# Patient Record
Sex: FEMALE | Race: BLACK OR AFRICAN AMERICAN | NOT HISPANIC OR LATINO | URBAN - METROPOLITAN AREA
[De-identification: names, ages, dates, MRNs, and addresses within clinical notes are randomized per-mention and may not be internally consistent; named-entity substitution may affect disease eponyms.]

---

## 2017-05-15 ENCOUNTER — EMERGENCY (EMERGENCY)
Facility: HOSPITAL | Age: 52
LOS: 1 days | Discharge: HOME | End: 2017-05-15
Attending: EMERGENCY MEDICINE | Admitting: EMERGENCY MEDICINE
Payer: COMMERCIAL

## 2017-05-15 VITALS
OXYGEN SATURATION: 99 % | RESPIRATION RATE: 18 BRPM | TEMPERATURE: 98 F | DIASTOLIC BLOOD PRESSURE: 92 MMHG | HEART RATE: 73 BPM | SYSTOLIC BLOOD PRESSURE: 157 MMHG

## 2017-05-15 PROCEDURE — 26750 TREAT FINGER FRACTURE EACH: CPT | Mod: FA

## 2017-05-15 PROCEDURE — 90715 TDAP VACCINE 7 YRS/> IM: CPT

## 2017-05-15 PROCEDURE — 90471 IMMUNIZATION ADMIN: CPT

## 2017-05-15 PROCEDURE — 26750 TREAT FINGER FRACTURE EACH: CPT | Mod: 54

## 2017-05-15 PROCEDURE — 99283 EMERGENCY DEPT VISIT LOW MDM: CPT | Mod: 25

## 2017-05-15 PROCEDURE — 73140 X-RAY EXAM OF FINGER(S): CPT | Mod: 26,LT

## 2017-05-15 PROCEDURE — 73140 X-RAY EXAM OF FINGER(S): CPT

## 2017-05-15 RX ORDER — ACETAMINOPHEN 500 MG
650 TABLET ORAL ONCE
Qty: 0 | Refills: 0 | Status: COMPLETED | OUTPATIENT
Start: 2017-05-15 | End: 2017-05-15

## 2017-05-15 RX ORDER — TETANUS TOXOID, REDUCED DIPHTHERIA TOXOID AND ACELLULAR PERTUSSIS VACCINE, ADSORBED 5; 2.5; 8; 8; 2.5 [IU]/.5ML; [IU]/.5ML; UG/.5ML; UG/.5ML; UG/.5ML
0.5 SUSPENSION INTRAMUSCULAR ONCE
Qty: 0 | Refills: 0 | Status: COMPLETED | OUTPATIENT
Start: 2017-05-15 | End: 2017-05-15

## 2017-05-15 RX ADMIN — TETANUS TOXOID, REDUCED DIPHTHERIA TOXOID AND ACELLULAR PERTUSSIS VACCINE, ADSORBED 0.5 MILLILITER(S): 5; 2.5; 8; 8; 2.5 SUSPENSION INTRAMUSCULAR at 02:54

## 2017-05-15 RX ADMIN — Medication 650 MILLIGRAM(S): at 02:54

## 2017-05-15 NOTE — ED PROVIDER NOTE - ATTENDING CONTRIBUTION TO CARE
------------ATTENDING NOTE------------   51 yo RHD female w/  c/o accidentally hitting L thumb w/ hammer 24 hrs ago, c/o slight swelling, moderate dull ache, ------------ATTENDING NOTE------------   51 yo RHD female w/  c/o accidentally hitting L thumb w/ hammer 24 hrs ago, c/o slight swelling, moderate dull ache, worse w/ bending, no numbness/weakness, unknown last tetanus update, awaiting radiographs -->  - Tai Smith MD   ---------------------------------------------------------------------- ------------ATTENDING NOTE------------   53 yo RHD female w/  c/o accidentally hitting L thumb w/ hammer 24 hrs ago, c/o slight swelling, moderate dull ache, worse w/ bending, no numbness/weakness, unknown last tetanus update, awaiting radiographs --> distal tuft fx, splint, in depth d/w all about ddx, tx, janet, sal.  - Tai Smith MD   ----------------------------------------------------------------------

## 2017-05-15 NOTE — ED PROVIDER NOTE - CARE PLAN
Principal Discharge DX:	Contusion of left thumb without damage to nail, initial encounter Principal Discharge DX:	Contusion of left thumb without damage to nail, initial encounter  Goal:	L 5th Distal Tuft Fx

## 2017-05-15 NOTE — ED PROVIDER NOTE - PHYSICAL EXAMINATION
Well Appearing, Nontoxic, NAD;  Symm Facies, PERRL 3mm, (-)Pallor, MMM;  AOX3, Normal speech, normal strength/sensation/gait;  L thumb w/ swelling distal to MCP, decreased ROM IP/MCP 2nd pain, nvi w/ bcr distally, trace subungual hematoma, nail intact

## 2017-05-15 NOTE — ED PROCEDURE NOTE - PROCEDURE ADDITIONAL DETAILS
L Thumb Distal Tuft Fx, nail intact, nvi w/ bcr distally    - aluminum/foam prefab splint    - normal alignment    Tai Smith MD

## 2017-05-15 NOTE — ED ADULT NURSE NOTE - OBJECTIVE STATEMENT
52 y.o. female presents ambulatory to ED c/o thumb injury. 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 Took advil and aleve prior to arrival. bleeding at nailbed is controlled. bruising and swelling noted to thumb. Unknown tetanus history. no other obvious injuries. Pulses and sensory in tact.
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
## 2017-05-19 DIAGNOSIS — S69.92XA UNSPECIFIED INJURY OF LEFT WRIST, HAND AND FINGER(S), INITIAL ENCOUNTER: ICD-10-CM
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
## 2017-05-19 DIAGNOSIS — Z23 ENCOUNTER FOR IMMUNIZATION: ICD-10-CM

## 2017-05-19 DIAGNOSIS — Z88.2 ALLERGY STATUS TO SULFONAMIDES: ICD-10-CM
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
## 2017-05-19 DIAGNOSIS — Z98.890 OTHER SPECIFIED POSTPROCEDURAL STATES: ICD-10-CM
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
## 2017-05-19 DIAGNOSIS — S62.522A DISPLACED FRACTURE OF DISTAL PHALANX OF LEFT THUMB, INITIAL ENCOUNTER FOR CLOSED FRACTURE: ICD-10-CM

## 2019-06-12 ENCOUNTER — APPOINTMENT (OUTPATIENT)
Dept: ORTHOPEDIC SURGERY | Facility: CLINIC | Age: 54
End: 2019-06-12
Payer: COMMERCIAL

## 2019-06-12 VITALS
HEIGHT: 64 IN | HEART RATE: 65 BPM | SYSTOLIC BLOOD PRESSURE: 146 MMHG | BODY MASS INDEX: 38.58 KG/M2 | DIASTOLIC BLOOD PRESSURE: 77 MMHG | WEIGHT: 226 LBS

## 2019-06-12 DIAGNOSIS — M17.12 UNILATERAL PRIMARY OSTEOARTHRITIS, LEFT KNEE: ICD-10-CM

## 2019-06-12 PROCEDURE — 99204 OFFICE O/P NEW MOD 45 MIN: CPT

## 2019-06-12 NOTE — PHYSICAL EXAM
[de-identified] : AP, notch standing, lateral and sunrise Xrays of the left knee:\par \par \par outside xrays 5/24/2019- medial and patellofemoral compartment degenerative narrowing, marginal osteophytes\par  [de-identified] : Well-nourished, in no acute distress\par Alert and oriented to time, place and person\par Skin: no lesions discoloration\par Respirations: unlabored\par Cardiac: no leg swelling\par Lymphatic: no groin adenopathy\par left knee: neutral alignment,  dorsal pedal pulse 1+, effusion 1+, flexion 0-110, crepitus, ligaments intact, tender lateral joint line, tender lateral facet patella

## 2019-06-12 NOTE — DISCUSSION/SUMMARY
[de-identified] : The patient presents with left knee pain and OA. We discussed the nature of the condition and treatment options. I am prescribing physical therapy. I am also referring the patient for an injection into the left knee. \par

## 2019-06-12 NOTE — ADDENDUM
[FreeTextEntry1] : I, Tai Blandon, acted solely as a scribe for Dr. George Song on 06/12/2019  .\par  \par All medical record entries made by the scribe were at my, Dr. George Song, direction and personally dictated by me on 06/12/2019. I have reviewed the chart and agree that the record accurately reflects my personal performance of the history, physical exam, assessment and plan. I have also personally directed, reviewed, and agreed with the chart.\par

## 2019-06-12 NOTE — HISTORY OF PRESENT ILLNESS
[de-identified] : 55 y/o spontaneous pain onset one year ago. symptom severity increased past few months. The pain is  anterior aspect. No radiation, but in bed reports additional left leg pain. The pain is aggravated by going up stairs. She reports relief of pain with anti inflammatories  but has limited intake because of concern of GI side effects. \par

## 2019-07-19 ENCOUNTER — APPOINTMENT (OUTPATIENT)
Dept: ORTHOPEDIC SURGERY | Facility: CLINIC | Age: 54
End: 2019-07-19
Payer: COMMERCIAL

## 2019-07-19 VITALS — BODY MASS INDEX: 38.58 KG/M2 | HEIGHT: 64 IN | WEIGHT: 226 LBS

## 2019-07-19 DIAGNOSIS — M17.12 UNILATERAL PRIMARY OSTEOARTHRITIS, LEFT KNEE: ICD-10-CM

## 2019-07-19 PROCEDURE — 99214 OFFICE O/P EST MOD 30 MIN: CPT | Mod: 25

## 2019-07-19 PROCEDURE — 20610 DRAIN/INJ JOINT/BURSA W/O US: CPT | Mod: LT

## 2019-07-19 NOTE — HISTORY OF PRESENT ILLNESS
[de-identified] : Patient is here for knee pain that began about 6 months ago without inciting event. She has been taking NSAIDs for pain but has done nothing else to treat it.  Patient was evaluated by Dr. Song and referred for cortisone injection. Patient would like to proceed with the injection at this time. There has been no significant change in their condition since last evaluation.

## 2019-07-19 NOTE — PROCEDURE
[de-identified] : Injection: Left knee joint.\par Indication: Osteoarthritis.\par \par A discussion was had with the patient regarding this procedure and all questions were answered. All risks, benefits and alternatives were discussed. These included but were not limited to bleeding, infection, allergic reaction and reaccumulation of fluid. A timeout was done to ensure correct side and pt agreed to the procedure.  Alcohol was used to clean the skin, and betadine was used to sterilize and prep the area in the lateral joint line aspect of the knee. Ethyl chloride spray was then used as a topical anesthetic. A 22-gauge needle was used to inject 2cc of 0.25% bupivacaine and 1cc of 40mg/ml methylprednisolone into the knee. A sterile bandage was then applied. The patient tolerated the procedure well.

## 2019-07-19 NOTE — DISCUSSION/SUMMARY
[de-identified] : Discussed findings of today's exam and possible causes of patient's pain. Educated patient on their most probable diagnosis of left knee osteoarthritis. The patient does have localized medial joint line tenderness, but she also has significant medial joint line narrowing, if any meniscus tears present is likely degenerative in nature and would not need surgical intervention as a primary treatment choice, as such patient was advised that we should defer MRI at this time for other conservative management.  Reviewed possible courses of treatment, and we collaboratively decided best course of treatment at this time will include cortisone injection today (see procedure note). Informed the patient that the numbing medicine in today's injection will last for about 4-6 hours. The steroid that was injected will start to work in 1 to 2 days, peak at 1-2 weeks, and may last up to 1-2 months. Patient may take oral NSAIDs as needed. Follow up as needed. Patient appreciates and agrees with current plan.\par \par This note was generated using dragon medical dictation software. A reasonable effort has been made for proofreading its contents, but typos may still remain. If there are any questions or points of clarification needed please notify my office. \par

## 2021-04-30 ENCOUNTER — APPOINTMENT (OUTPATIENT)
Dept: ORTHOPEDIC SURGERY | Facility: CLINIC | Age: 56
End: 2021-04-30
Payer: COMMERCIAL

## 2021-04-30 VITALS
HEART RATE: 90 BPM | SYSTOLIC BLOOD PRESSURE: 177 MMHG | WEIGHT: 259 LBS | HEIGHT: 64 IN | OXYGEN SATURATION: 96 % | BODY MASS INDEX: 44.22 KG/M2 | DIASTOLIC BLOOD PRESSURE: 106 MMHG

## 2021-04-30 DIAGNOSIS — M43.16 SPONDYLOLISTHESIS, LUMBAR REGION: ICD-10-CM

## 2021-04-30 PROCEDURE — 99072 ADDL SUPL MATRL&STAF TM PHE: CPT

## 2021-04-30 PROCEDURE — 99214 OFFICE O/P EST MOD 30 MIN: CPT

## 2021-04-30 NOTE — PHYSICAL EXAM
[de-identified] : Examination of the lumbar spine reveals no midline tenderness palpation, step-offs, or skin lesions. Decreased range of motion with respect to flexion, extension, lateral bending, and rotation. No tenderness to palpation of the sciatic notch. No tenderness palpation of the bilateral greater trochanters. No pain with passive internal/external rotation of the hips. No instability of bilateral lower extremities.  Negative MARLYS. Negative straight leg raise bilaterally. No bowstring. Negative femoral stretch. 5 out of 5 iliopsoas, hip abductors, hips adductors, quadriceps, hamstrings, gastrocsoleus, tibialis anterior, extensor hallucis longus, peroneals. Grossly intact sensation to light touch bilateral lower extremities. 1+ patellar and Achilles reflexes. Downgoing Babinski. No clonus. Intact proprioception. Palpable pulses. No skin lesion and no edema on the right and left lower extremities. [de-identified] : Reviewed xray report from [facility] reveals L4-5 spondylolisthesis

## 2021-04-30 NOTE — DISCUSSION/SUMMARY
[de-identified] : She will try some physical therapy as well as Voltaren.  MRI if not improved or worsened.

## 2021-04-30 NOTE — HISTORY OF PRESENT ILLNESS
[de-identified] : Ms. FRAN SHEA  is a 55 year old female who presents with one year of low back pain and proximal thigh pain.  She works in housekeeping and bakes a great deal.  Denies any LE radicular symptoms.  Normal bowel and bladder control.   Denies any recent fevers, chills, sweats, weight loss, or infection.  She went to [facility] and was given an anti-inflammatory which has helped her. \par \par The patients past medical history, past surgical history, medications, allergies, and social history were reviewed by me today with the patient and documented accordingly.  In addition, the patient's family history, which is noncontributory to their visit, was also reviewed.\par

## 2021-05-19 ENCOUNTER — RX RENEWAL (OUTPATIENT)
Age: 56
End: 2021-05-19

## 2021-06-03 ENCOUNTER — APPOINTMENT (OUTPATIENT)
Dept: GASTROENTEROLOGY | Facility: CLINIC | Age: 56
End: 2021-06-03
Payer: COMMERCIAL

## 2021-06-03 ENCOUNTER — NON-APPOINTMENT (OUTPATIENT)
Age: 56
End: 2021-06-03

## 2021-06-03 VITALS
TEMPERATURE: 96.9 F | OXYGEN SATURATION: 98 % | SYSTOLIC BLOOD PRESSURE: 125 MMHG | WEIGHT: 248.38 LBS | BODY MASS INDEX: 42.4 KG/M2 | HEIGHT: 64 IN | DIASTOLIC BLOOD PRESSURE: 90 MMHG | HEART RATE: 80 BPM

## 2021-06-03 DIAGNOSIS — Z12.11 ENCOUNTER FOR SCREENING FOR MALIGNANT NEOPLASM OF COLON: ICD-10-CM

## 2021-06-03 DIAGNOSIS — Z86.010 ENCOUNTER FOR SCREENING FOR MALIGNANT NEOPLASM OF COLON: ICD-10-CM

## 2021-06-03 PROCEDURE — 99203 OFFICE O/P NEW LOW 30 MIN: CPT

## 2021-06-03 PROCEDURE — 99072 ADDL SUPL MATRL&STAF TM PHE: CPT

## 2021-06-25 PROBLEM — Z12.11 ENCOUNTER FOR COLONOSCOPY DUE TO HISTORY OF ADENOMATOUS COLONIC POLYPS: Status: RESOLVED | Noted: 2021-06-25 | Resolved: 2021-07-09

## 2021-06-25 NOTE — HISTORY OF PRESENT ILLNESS
[FreeTextEntry1] : 56 here to discuss screening colonoscopy\par No symptoms\par No melena or hematochezia\par No cramping or bloating or tenesmus\par No weight loss or change in appetite\par No upper GI symptoms

## 2021-06-25 NOTE — PHYSICAL EXAM
[General Appearance - Alert] : alert [General Appearance - In No Acute Distress] : in no acute distress [General Appearance - Well Nourished] : well nourished [General Appearance - Well Developed] : well developed [Sclera] : the sclera and conjunctiva were normal [Neck Appearance] : the appearance of the neck was normal [Edema] : there was no peripheral edema [Abdomen Soft] : soft [Bowel Sounds] : normal bowel sounds [Abdomen Tenderness] : non-tender [Cervical Lymph Nodes Enlarged Posterior Bilaterally] : posterior cervical [Cervical Lymph Nodes Enlarged Anterior Bilaterally] : anterior cervical [No CVA Tenderness] : no ~M costovertebral angle tenderness [No Spinal Tenderness] : no spinal tenderness [Nail Clubbing] : no clubbing  or cyanosis of the fingernails [] : no rash [No Focal Deficits] : no focal deficits [Oriented To Time, Place, And Person] : oriented to person, place, and time [Impaired Insight] : insight and judgment were intact [Affect] : the affect was normal

## 2023-02-10 ENCOUNTER — APPOINTMENT (OUTPATIENT)
Dept: PAIN MANAGEMENT | Facility: CLINIC | Age: 58
End: 2023-02-10
Payer: COMMERCIAL

## 2023-02-10 VITALS — WEIGHT: 248 LBS | HEIGHT: 64 IN | BODY MASS INDEX: 42.34 KG/M2

## 2023-02-10 DIAGNOSIS — Z86.79 PERSONAL HISTORY OF OTHER DISEASES OF THE CIRCULATORY SYSTEM: ICD-10-CM

## 2023-02-10 DIAGNOSIS — Z78.9 OTHER SPECIFIED HEALTH STATUS: ICD-10-CM

## 2023-02-10 DIAGNOSIS — M54.16 RADICULOPATHY, LUMBAR REGION: ICD-10-CM

## 2023-02-10 PROCEDURE — 72100 X-RAY EXAM L-S SPINE 2/3 VWS: CPT

## 2023-02-10 PROCEDURE — 99204 OFFICE O/P NEW MOD 45 MIN: CPT

## 2023-02-10 RX ORDER — DICLOFENAC SODIUM 75 MG/1
75 TABLET, DELAYED RELEASE ORAL TWICE DAILY
Qty: 60 | Refills: 0 | Status: ACTIVE | COMMUNITY
Start: 2023-02-10 | End: 1900-01-01

## 2023-02-10 NOTE — HISTORY OF PRESENT ILLNESS
[Lower back] : lower back [5] : 5 [3] : 3 [Radiating] : radiating [Throbbing] : throbbing [Constant] : constant [Household chores] : household chores [Leisure] : leisure [Sleep] : sleep [Rest] : rest [Meds] : meds [Tingling] : tingling [FreeTextEntry1] : 02/10/2023 : Patient presents for initial evaluation. She reports lower back pain that started last year when she returned back to work as a . Pain is located in the left lower back and right buttock. +n/t in the feet. mild weakness.  Similar pain in 2021 which was resolved with NSAIDS\par \par Subjective Weakness: Yes\par Numbness/Tingling: Yes\par Bladder/Bowel dysfunction: No\par Physical Therapy:No\par \par XR: mild lithesis at L4/5 with mild DDD, on AP has FA at L4/5 L5/S1\par \par \par Attempted modalities for current pain complaint:\par See above:\par Medications: No- Aleve Arthritis PRN\par \par Injections: No \par \par Previous Spine Surgery: N/A\par \par Imaging:\par MRI Lumbar Spine (date): NTD \par   [] : no [FreeTextEntry6] : numbness on the toes and fingers  [FreeTextEntry7] : right buttock.  [FreeTextEntry9] : ointment  [de-identified] : house work

## 2023-02-10 NOTE — PHYSICAL EXAM
[NL (90)] : forward flexion 90 degrees [Extension] : extension [4___] : right hip flexion 4[unfilled]/5 [] : no lumbar paraspinal spasm [de-identified] : extension 20 degrees

## 2023-02-10 NOTE — ASSESSMENT
[FreeTextEntry1] : After discussing various treatment options with the patient including but not limited to oral medications, physical therapy, exercise, modalities as well as interventional spinal injections, we have decided with the following plan:\par \par 1) A MRI is indicated as there has been failure of numerous conservative therapies over the last 6-8 weeks. these include but are not limited to medication therapy and physical therapy. It is recognized that repeat imaging studies and other tests may be warranted by the clinical course and/or to follow the progress of treatment in some cases. It may be of value to repeat diagnostic procedures (ie imaging studies) during the course of care to reassess or stage the pathology when there is progression of symptoms or findings, prior to surgical interventions and/or therapeutic injections when clinically indicated. \par \par 2) The patient would benefit from continuation of physical therapy. Short and Long Term goals would be improvement of pain level, improvement of range of motion, improvement of strength and overall improvement of quality of life.\par \par Patient instructed to continue both active and passive therapy, at home as an extension of the treatment process in order to maintain improvement. \par \par Goals: improve cardiovascular fitness, reduce edema, improve muscle strength, improve connective tissue strength and integrity, increase bone density, promote circulation to enhance soft tissue healing, improvement of muscle recruitment, increased ROM and promotion of normal movement. \par \par 3) There is a moderate risk of morbidity with further treatment, especially from use of prescription strength medications and possible side effects of these medications which include upset stomach with oral medications, skin reactions to topical medications and cardiac/renal issues with long term use.\par \par I recommended that the patient follow-up with their medical physician to discuss any significant specific potential issues with long term medication use such as interactions with current medications or with exacerbation of underlying medical comorbidities.\par \par The benefits and risks associated with use of injectable, oral or topical, prescription and over the counter anti-inflammatory medications were discussed with the patient. The patient voiced understanding of the risks including but not limited to bleeding, stroke, kidney dysfunction, heart disease, and were referred to the black box warning label for further information.

## 2023-02-10 NOTE — CONSULT LETTER
[Dear  ___] : Dear  [unfilled], [Consult Letter:] : I had the pleasure of evaluating your patient, [unfilled]. [( Thank you for referring [unfilled] for consultation for _____ )] : Thank you for referring [unfilled] for consultation for [unfilled] [Please see my note below.] : Please see my note below. [Consult Closing:] : Thank you very much for allowing me to participate in the care of this patient.  If you have any questions, please do not hesitate to contact me. [Sincerely,] : Sincerely, [FreeTextEntry3] : Karlie Marshall MD

## 2023-02-17 ENCOUNTER — FORM ENCOUNTER (OUTPATIENT)
Age: 58
End: 2023-02-17

## 2023-02-18 ENCOUNTER — APPOINTMENT (OUTPATIENT)
Dept: MRI IMAGING | Facility: CLINIC | Age: 58
End: 2023-02-18
Payer: COMMERCIAL

## 2023-02-18 PROCEDURE — 72148 MRI LUMBAR SPINE W/O DYE: CPT

## 2023-03-17 ENCOUNTER — APPOINTMENT (OUTPATIENT)
Dept: PAIN MANAGEMENT | Facility: CLINIC | Age: 58
End: 2023-03-17

## 2023-03-28 RX ORDER — DICLOFENAC SODIUM 75 MG/1
75 TABLET, DELAYED RELEASE ORAL
Qty: 30 | Refills: 0 | Status: ACTIVE | COMMUNITY
Start: 2021-04-30

## 2023-04-28 ENCOUNTER — APPOINTMENT (OUTPATIENT)
Dept: PAIN MANAGEMENT | Facility: CLINIC | Age: 58
End: 2023-04-28
Payer: COMMERCIAL

## 2023-04-28 VITALS — BODY MASS INDEX: 42.34 KG/M2 | WEIGHT: 248 LBS | HEIGHT: 64 IN

## 2023-04-28 DIAGNOSIS — M47.816 SPONDYLOSIS W/OUT MYELOPATHY OR RADICULOPATHY, LUMBAR REGION: ICD-10-CM

## 2023-04-28 DIAGNOSIS — M17.11 UNILATERAL PRIMARY OSTEOARTHRITIS, RIGHT KNEE: ICD-10-CM

## 2023-04-28 PROCEDURE — 73560 X-RAY EXAM OF KNEE 1 OR 2: CPT | Mod: LT

## 2023-04-28 PROCEDURE — 99214 OFFICE O/P EST MOD 30 MIN: CPT

## 2023-04-28 NOTE — HISTORY OF PRESENT ILLNESS
[Lower back] : lower back [5] : 5 [3] : 3 [Radiating] : radiating [Throbbing] : throbbing [Tingling] : tingling [Constant] : constant [Household chores] : household chores [Leisure] : leisure [Sleep] : sleep [Rest] : rest [Meds] : meds [FreeTextEntry1] : 4/28/23: pt is following up for lower back pain MRI. 2/18/23: Impression: \par 1. T11-T12: Broad bulge.\par 2. L1-L2: Facet hypertrophy and ligamentum flavum hypertrophy.\par 3. L2-L3: Facet hypertrophy and ligamentum flavum hypertrophy.\par 4. L3-L4: Facet hypertrophy and ligamentum flavum hypertrophy.\par 5. L4-L5: Grade 1 anterior spondylolisthesis. Broad bulge, facet hypertrophy, and ligamentum flavum \par hypertrophy with inferior foraminal stenosis.\par 6. L5-S1: Broad bulge, facet hypertrophy left greater than right with inferior foraminal stenosis left greater than \par right.\par 7. Enlarged uterus incompletely evaluated uterine leiomyoma. Ultrasound suggested for further evaluation.\par \par Pain correlates with her MRI with facet arthrosis throughout.  Pain in the b/l knees. had visco in the left knee last year with some relief.  had CSI in the left knee in the past that was ineffective. takes NSAIDS \par \par 02/10/2023 : Patient presents for initial evaluation. She reports lower back pain that started last year when she returned back to work as a . Pain is located in the left lower back and right buttock. +n/t in the feet. mild weakness.  Similar pain in 2021 which was resolved with NSAIDS\par \par Subjective Weakness: Yes\par Numbness/Tingling: Yes\par Bladder/Bowel dysfunction: No\par Physical Therapy:No\par \par XR: mild lithesis at L4/5 with mild DDD, on AP has FA at L4/5 L5/S1\par \par \par Attempted modalities for current pain complaint:\par See above:\par Medications: No- Aleve Arthritis PRN\par \par Injections: No \par \par Previous Spine Surgery: N/A\par \par Imaging:\par MRI Lumbar Spine (2/18/23)\par   [] : no [FreeTextEntry6] : numbness on the toes and fingers  [FreeTextEntry7] : right buttock.  [FreeTextEntry9] : ointment  [de-identified] : house work

## 2023-04-28 NOTE — ASSESSMENT
[FreeTextEntry1] : After discussing various treatment options with the patient including but not limited to oral medications, physical therapy, exercise, modalities as well as interventional spinal injections, we have decided with the following plan:\par \par 1) Intervention Injection Therapy:\par I personally reviewed the MRI/CT scan images and agree with the radiologist's report. The radiological findings were discussed with the patient.\par The risks, benefits, contents and alternatives to injection were explained in full to the patient. Risks outlined include but are not limited to infection,sepsis, bleeding, post-dural puncture headache, nerve damage, temporary increase in pain, syncopal episode, failure to resolve symptoms, allergic reaction, symptom recurrence, and elevation of blood sugar in diabetics. Cortisone may cause immunosuppression. Patient understands the risks. All questions were answered. After discussion of options, patient requested an injection. Information regarding the injection was given to the patient. Which medications to stop prior to the injection was explained to the patient as well.\par \par Follow up in 1-2 weeks post injection for re-evaluation. \par Continue Home exercises, stretching, activity modification, physical therapy, and conservative care.\par \par Patient presents with axial lumbar pain that has not responded to  3 months of conservative therapy including physical therapy or NSAID therapy.  The pain is interfering with activities of daily living and functionality.   There is no radicular pain.   The pain is exacerbated by facet loading.  Positive Kemps maneuver which is defined by pain reproduction with extension and rotation of the lumbar spine to the affected side.  The patient has not had a vertebral fusion at the levels of the proposed treatment.  There is no unexplained neurologic deficit.  There is no history of systemic infection, unstable medical condition, bleeding tendency, or local infection.  The injection is being performed to diagnose the facet joint as the source of the individual's pain. \par \par 2) The patient would benefit from continuation of physical therapy. Short and Long Term goals would be improvement of pain level, improvement of range of motion, improvement of strength and overall improvement of quality of life.\par \par Patient instructed to continue both active and passive therapy, at home as an extension of the treatment process in order to maintain improvement. \par \par Goals: improve cardiovascular fitness, reduce edema, improve muscle strength, improve connective tissue strength and integrity, increase bone density, promote circulation to enhance soft tissue healing, improvement of muscle recruitment, increased ROM and promotion of normal movement. \par \par 3) auth for visco b/l knees. xrays reveal b/l medial compartment arthrosis and patellofemoral arthrosis.

## 2023-04-28 NOTE — PHYSICAL EXAM
[NL (90)] : forward flexion 90 degrees [Extension] : extension [4___] : right hip flexion 4[unfilled]/5 [Bilateral] : knee bilaterally [] : no erythema [TWNoteComboBox7] : flexion 95 degrees [de-identified] : extension -5 degrees

## 2023-05-15 RX ORDER — HYALURONATE SODIUM 30 MG/2 ML
30 SYRINGE (ML) INTRAARTICULAR
Qty: 8 | Refills: 0 | Status: ACTIVE | COMMUNITY
Start: 2023-05-15 | End: 1900-01-01

## 2023-08-31 ENCOUNTER — APPOINTMENT (OUTPATIENT)
Dept: PAIN MANAGEMENT | Facility: CLINIC | Age: 58
End: 2023-08-31

## 2023-08-31 NOTE — HISTORY OF PRESENT ILLNESS
[Lower back] : lower back [5] : 5 [3] : 3 [Radiating] : radiating [Throbbing] : throbbing [Tingling] : tingling [Constant] : constant [Household chores] : household chores [Leisure] : leisure [Sleep] : sleep [Rest] : rest [Meds] : meds [FreeTextEntry1] : 8/31/23 4/28/23: pt is following up for lower back pain MRI. 2/18/23: Impression:  1. T11-T12: Broad bulge. 2. L1-L2: Facet hypertrophy and ligamentum flavum hypertrophy. 3. L2-L3: Facet hypertrophy and ligamentum flavum hypertrophy. 4. L3-L4: Facet hypertrophy and ligamentum flavum hypertrophy. 5. L4-L5: Grade 1 anterior spondylolisthesis. Broad bulge, facet hypertrophy, and ligamentum flavum  hypertrophy with inferior foraminal stenosis. 6. L5-S1: Broad bulge, facet hypertrophy left greater than right with inferior foraminal stenosis left greater than  right. 7. Enlarged uterus incompletely evaluated uterine leiomyoma. Ultrasound suggested for further evaluation.  Pain correlates with her MRI with facet arthrosis throughout.  Pain in the b/l knees. had visco in the left knee last year with some relief.  had CSI in the left knee in the past that was ineffective. takes NSAIDS   02/10/2023 : Patient presents for initial evaluation. She reports lower back pain that started last year when she returned back to work as a . Pain is located in the left lower back and right buttock. +n/t in the feet. mild weakness.  Similar pain in 2021 which was resolved with NSAIDS  Subjective Weakness: Yes Numbness/Tingling: Yes Bladder/Bowel dysfunction: No Physical Therapy:No  XR: mild lithesis at L4/5 with mild DDD, on AP has FA at L4/5 L5/S1   Attempted modalities for current pain complaint: See above: Medications: No- Aleve Arthritis PRN  Injections: No   Previous Spine Surgery: N/A  Imaging: MRI Lumbar Spine (2/18/23)   [] : no [FreeTextEntry6] : numbness on the toes and fingers  [FreeTextEntry7] : right buttock.  [FreeTextEntry9] : ointment  [de-identified] : house work

## 2023-08-31 NOTE — PHYSICAL EXAM
[NL (90)] : forward flexion 90 degrees [Extension] : extension [4___] : right hip flexion 4[unfilled]/5 [Bilateral] : knee bilaterally [] : no erythema [TWNoteComboBox7] : flexion 95 degrees [de-identified] : extension -5 degrees

## 2024-07-02 ENCOUNTER — EMERGENCY (EMERGENCY)
Facility: HOSPITAL | Age: 59
LOS: 1 days | Discharge: HOME | End: 2024-07-02
Attending: EMERGENCY MEDICINE
Payer: COMMERCIAL

## 2024-07-02 VITALS
HEART RATE: 67 BPM | RESPIRATION RATE: 18 BRPM | WEIGHT: 244.93 LBS | DIASTOLIC BLOOD PRESSURE: 95 MMHG | TEMPERATURE: 98 F | SYSTOLIC BLOOD PRESSURE: 157 MMHG | OXYGEN SATURATION: 99 % | HEIGHT: 64 IN

## 2024-07-02 VITALS
TEMPERATURE: 98 F | SYSTOLIC BLOOD PRESSURE: 162 MMHG | RESPIRATION RATE: 16 BRPM | OXYGEN SATURATION: 98 % | HEART RATE: 72 BPM | DIASTOLIC BLOOD PRESSURE: 78 MMHG

## 2024-07-02 PROCEDURE — 73564 X-RAY EXAM KNEE 4 OR MORE: CPT | Mod: 26,RT

## 2024-07-02 PROCEDURE — 99284 EMERGENCY DEPT VISIT MOD MDM: CPT

## 2024-07-02 PROCEDURE — 73564 X-RAY EXAM KNEE 4 OR MORE: CPT

## 2024-07-02 PROCEDURE — 99283 EMERGENCY DEPT VISIT LOW MDM: CPT | Mod: 25

## 2024-07-02 RX ORDER — ACETAMINOPHEN 325 MG
975 TABLET ORAL ONCE
Refills: 0 | Status: COMPLETED | OUTPATIENT
Start: 2024-07-02 | End: 2024-07-02

## 2024-07-02 RX ORDER — ACETAMINOPHEN 325 MG
650 TABLET ORAL ONCE
Refills: 0 | Status: DISCONTINUED | OUTPATIENT
Start: 2024-07-02 | End: 2024-07-02

## 2024-07-02 RX ADMIN — Medication 975 MILLIGRAM(S): at 17:44

## 2024-07-23 ENCOUNTER — EMERGENCY (EMERGENCY)
Facility: HOSPITAL | Age: 59
LOS: 1 days | Discharge: HOME | End: 2024-07-23
Attending: EMERGENCY MEDICINE | Admitting: EMERGENCY MEDICINE
Payer: OTHER MISCELLANEOUS

## 2024-07-23 VITALS
SYSTOLIC BLOOD PRESSURE: 147 MMHG | TEMPERATURE: 99 F | DIASTOLIC BLOOD PRESSURE: 61 MMHG | RESPIRATION RATE: 17 BRPM | HEART RATE: 81 BPM | HEIGHT: 64 IN | WEIGHT: 250 LBS | OXYGEN SATURATION: 98 %

## 2024-07-23 VITALS
TEMPERATURE: 98 F | RESPIRATION RATE: 17 BRPM | DIASTOLIC BLOOD PRESSURE: 89 MMHG | HEART RATE: 84 BPM | OXYGEN SATURATION: 99 % | SYSTOLIC BLOOD PRESSURE: 152 MMHG

## 2024-07-23 PROBLEM — I10 ESSENTIAL (PRIMARY) HYPERTENSION: Chronic | Status: ACTIVE | Noted: 2024-07-02

## 2024-07-23 PROCEDURE — 93971 EXTREMITY STUDY: CPT | Mod: 26,LT

## 2024-07-23 PROCEDURE — 73564 X-RAY EXAM KNEE 4 OR MORE: CPT | Mod: 26,RT

## 2024-07-23 PROCEDURE — 99284 EMERGENCY DEPT VISIT MOD MDM: CPT

## 2024-07-23 RX ORDER — ACETAMINOPHEN 325 MG
650 TABLET ORAL ONCE
Refills: 0 | Status: COMPLETED | OUTPATIENT
Start: 2024-07-23 | End: 2024-07-23

## 2024-07-23 RX ADMIN — Medication 650 MILLIGRAM(S): at 17:37

## 2024-07-23 RX ADMIN — Medication 600 MILLIGRAM(S): at 17:37

## 2024-07-23 NOTE — ED PROVIDER NOTE - PROGRESS NOTE DETAILS
YANA Rodriguez: US showing likely Bakers cyst, no DVT. Pt placed in bulky greer dressing. Pt to follow up with her orthopedic doctor, she will return to the ER with any worsening or concerning symptoms.

## 2024-07-23 NOTE — ED PROVIDER NOTE - PATIENT PORTAL LINK FT
You can access the FollowMyHealth Patient Portal offered by [facility] by registering at the following website: [email or web address]. By joining [facility]’s FollowMyHealth portal, you will also be able to view your health information using other applications (apps) compatible with our system.

## 2024-07-23 NOTE — ED PROVIDER NOTE - OBJECTIVE STATEMENT
59yF w/pmhx HTN presenting to the ED with 3 weeks of right knee pain 59yF w/pmhx HTN presenting to the ED with 3 weeks of right knee pain. Pt states she had been out of work with a left leg injury, returned to work at the end of June and reports since then has been having right knee pain. Pt is ambulating with a cane and feel she has been favoring the right leg due to left leg injury. Pt reports right knee and lower extremity. Pain and swelling are worse this past week. Pt denies fever/chills, known trauma or injury, numbness/tingling, weakness, cp, sob, abd pain, n/v/d, hx of DVT, recent travel or illness or any other concerns. 59yF w/pmhx HTN presenting to the ED with 3 weeks of right knee pain. Pt states she had been out of work with a left leg injury, returned to work at the end of  and reports since then has been having right knee pain. Pt is ambulating with a cane and feel she has been favoring the right leg due to left leg injury. Pt reports right knee and lower extremity. Pain and swelling are worse this past week. Pt denies fever/chills, known trauma or injury, numbness/tingling, weakness, cp, sob, abd pain, n/v/d, hx of DVT, recent travel or illness or any other concerns.    Attendinyo female presents with right knee pain for several days.  had left knee pain and had been favoring the right.  now with right knee pain.

## 2024-07-23 NOTE — ED PROVIDER NOTE - CLINICAL SUMMARY MEDICAL DECISION MAKING FREE TEXT BOX
59yF w/pmhx HTN presenting to the ED with 3 weeks of right knee pain. Pt states she had been out of work with a left leg injury, returned to work at the end of June and reports since then has been having right knee pain. Pt is ambulating with a cane and feel she has been favoring the right leg due to left leg injury. Pt reports right knee and lower extremity. No known trauma, no fever/chills, skin changes, no hx DVT. On exam pt is well appearing, afebrile, right knee with mild swelling, +RLE non pitting edema from knee through foot, limited ROM of right knee 2/2 pain, no overlying warmth, no skin changes, NV intact. Concern for DVT, Bakers Cyst, arthritis. Exam and history not consistent with septic joint. Plan: xray knee, US duplex RLE to r/o DVT, pain control

## 2024-07-23 NOTE — ED ADULT NURSE NOTE - OBJECTIVE STATEMENT
Pt received to wellness room 5. Pt A&O x 3, ambulatory with cane. Pt c/o right knee pain x 4 weeks. Pt has previous injury to left knee and thinks the gait changes strained her right knee. Pt endorses swelling. Slight swelling noted. Pt denies dizziness, headache, vision changes, chest pain, SOB, N&V, or urinary symptoms. Respirations even and unlabored. +2 pulses in all extremities. Safety maintained. Pending PA evaluation.

## 2024-07-23 NOTE — ED ADULT NURSE NOTE - NSFALLUNIVINTERV_ED_ALL_ED
Bed/Stretcher in lowest position, wheels locked, appropriate side rails in place/Call bell, personal items and telephone in reach/Instruct patient to call for assistance before getting out of bed/chair/stretcher/Non-slip footwear applied when patient is off stretcher/[place] to call system/Physically safe environment - no spills, clutter or unnecessary equipment/Purposeful proactive rounding/Room/bathroom lighting operational, light cord in reach

## 2024-07-23 NOTE — ED PROVIDER NOTE - NSICDXPASTMEDICALHX_GEN_ALL_CORE_FT
PAST MEDICAL HISTORY:  Benign Breast Biopsy       Section 4/3/2002     Herniated Cervical Disc     HTN (hypertension)

## 2024-07-23 NOTE — ED PROVIDER NOTE - NSFOLLOWUPINSTRUCTIONS_ED_ALL_ED_FT
Follow up with your orthopedic doctor within 1 week  Take Ibuprofen 600mg every 6-8 hours as needed for pain, take with food  -You can also take Tylenol 650mg every 6 hours as needed for pain  Return to the ER with any worsening or concerning symptoms, increased pain or swelling, numbness, weakness, fever or any other concerns.

## 2024-07-23 NOTE — ED ADULT TRIAGE NOTE - CHIEF COMPLAINT QUOTE
pt ambulatory with cane to triage, c/o R knee x 4 weeks. has previous injury to L knee and thinks the gait changes strained her R knee.

## 2024-09-09 ENCOUNTER — NON-APPOINTMENT (OUTPATIENT)
Age: 59
End: 2024-09-09

## 2024-09-09 ENCOUNTER — APPOINTMENT (OUTPATIENT)
Dept: ORTHOPEDIC SURGERY | Facility: CLINIC | Age: 59
End: 2024-09-09
Payer: OTHER MISCELLANEOUS

## 2024-09-09 VITALS — WEIGHT: 245 LBS | HEIGHT: 63 IN | BODY MASS INDEX: 43.41 KG/M2

## 2024-09-09 DIAGNOSIS — M17.11 UNILATERAL PRIMARY OSTEOARTHRITIS, RIGHT KNEE: ICD-10-CM

## 2024-09-09 DIAGNOSIS — M17.12 UNILATERAL PRIMARY OSTEOARTHRITIS, LEFT KNEE: ICD-10-CM

## 2024-09-09 PROCEDURE — 99204 OFFICE O/P NEW MOD 45 MIN: CPT

## 2024-09-09 NOTE — DISCUSSION/SUMMARY
[de-identified] : General Dx Discussion The patient was advised of the diagnosis. The natural history of the pathology was explained in full to the patient in layman's terms. All questions were answered. The risks and benefits of surgical and non-surgical treatment alternatives were explained in full to the patient.  will cont care with Dr Eduardo mace vs TKA discussed

## 2024-09-09 NOTE — WORK
[Sprain/Strain] : sprain/strain [Other: ___] : [unfilled] [Was the competent medical cause of the injury] : was the competent medical cause of the injury [Are consistent with the injury] : are consistent with the injury [Consistent with my objective findings] : consistent with my objective findings [Total (100%)] : total (100%) [Reveals pre-existing condition(s) that may affect treatment/prognosis] : reveals pre-existing condition(s) that may affect treatment/prognosis [N/A] : : Not Applicable [Patient] : patient [No Rx restrictions] : No Rx restrictions. [I provided the services listed above] :  I provided the services listed above. [FreeTextEntry2] : OA

## 2024-09-09 NOTE — PHYSICAL EXAM
[Bilateral] : knee bilaterally [5___] : hamstring 5[unfilled]/5 [Negative] : negative Lex's [] : no pain with varus stress [TWNoteComboBox7] : flexion 80 degrees [de-identified] : extension 5 degrees

## 2024-09-09 NOTE — HISTORY OF PRESENT ILLNESS
[9] : 9 [Burning] : burning [Throbbing] : throbbing [Constant] : constant [Leisure] : leisure [Sleep] : sleep [Standing] : standing [Stairs] : stairs [Not working due to injury] : Work status: not working due to injury [de-identified] : WC DOI 7/13/2023: Patient slipped on the floor at work and landed on her left side. The right knee became painful this July and was added on to the case. [] : no [FreeTextEntry1] : Right knee [FreeTextEntry9] : Tylenol [de-identified] : bending [de-identified] : 7/30/2024 [de-identified] : Dr Ivan [de-identified] : xray

## 2024-09-09 NOTE — DATA REVIEWED
[Outside X-rays] : outside x-rays [Bilateral] : of the bilateral [Knee] : knee [Report was reviewed and noted in the chart] : The report was reviewed and noted in the chart [I reviewed the films/CD] : I reviewed the films/CD [FreeTextEntry1] : OA bilateral knees

## 2024-09-09 NOTE — REASON FOR VISIT
[FreeTextEntry2] : WC New Injury-right knee and left knee  rt knee has been established as a consequential injury  seeing Dr Clark for her conditions she reports she cannot go back to work due to pain  Dr Clark was not available for an appt soon